# Patient Record
Sex: FEMALE | Race: WHITE | Employment: FULL TIME | URBAN - METROPOLITAN AREA
[De-identification: names, ages, dates, MRNs, and addresses within clinical notes are randomized per-mention and may not be internally consistent; named-entity substitution may affect disease eponyms.]

---

## 2023-11-21 LAB
C. TRACHOMATIS, EXTERNAL RESULT: NEGATIVE
N. GONORRHOEAE, EXTERNAL RESULT: NEGATIVE

## 2023-11-23 PROCEDURE — 99283 EMERGENCY DEPT VISIT LOW MDM: CPT

## 2023-11-23 ASSESSMENT — PAIN - FUNCTIONAL ASSESSMENT: PAIN_FUNCTIONAL_ASSESSMENT: NONE - DENIES PAIN

## 2023-11-24 ENCOUNTER — HOSPITAL ENCOUNTER (EMERGENCY)
Age: 18
Discharge: HOME | End: 2023-11-24
Attending: STUDENT IN AN ORGANIZED HEALTH CARE EDUCATION/TRAINING PROGRAM
Payer: COMMERCIAL

## 2023-11-24 VITALS
SYSTOLIC BLOOD PRESSURE: 136 MMHG | TEMPERATURE: 98.5 F | HEART RATE: 84 BPM | BODY MASS INDEX: 37.49 KG/M2 | OXYGEN SATURATION: 100 % | RESPIRATION RATE: 16 BRPM | WEIGHT: 211.6 LBS | HEIGHT: 63 IN | DIASTOLIC BLOOD PRESSURE: 78 MMHG

## 2023-11-24 DIAGNOSIS — O46.90 VAGINAL BLEEDING IN PREGNANCY: Primary | ICD-10-CM

## 2023-11-24 LAB
ABO + RH BLD: NORMAL
ALBUMIN SERPL-MCNC: 4.3 G/DL (ref 3.4–5)
ALBUMIN/GLOB SERPL: 1.7 {RATIO} (ref 1.1–2.2)
ALP SERPL-CCNC: 59 U/L (ref 40–129)
ALT SERPL-CCNC: 19 U/L (ref 10–40)
ANION GAP SERPL CALCULATED.3IONS-SCNC: 12 MMOL/L (ref 3–16)
AST SERPL-CCNC: 14 U/L (ref 15–37)
B-HCG SERPL EIA 3RD IS-ACNC: NORMAL MIU/ML
BACTERIA GENITAL QL WET PREP: NORMAL
BACTERIA URNS QL MICRO: ABNORMAL /HPF
BASOPHILS # BLD: 0.1 K/UL (ref 0–0.2)
BASOPHILS NFR BLD: 0.7 %
BILIRUB SERPL-MCNC: 0.4 MG/DL (ref 0–1)
BILIRUB UR QL STRIP.AUTO: NEGATIVE
BUN SERPL-MCNC: 13 MG/DL (ref 7–20)
CALCIUM SERPL-MCNC: 9.2 MG/DL (ref 8.3–10.6)
CHLORIDE SERPL-SCNC: 103 MMOL/L (ref 99–110)
CLARITY UR: CLEAR
CLUE CELLS SPEC QL WET PREP: NORMAL
CO2 SERPL-SCNC: 22 MMOL/L (ref 21–32)
COLOR UR: YELLOW
CREAT SERPL-MCNC: 0.7 MG/DL (ref 0.6–1.1)
DEPRECATED RDW RBC AUTO: 13 % (ref 12.4–15.4)
EOSINOPHIL # BLD: 0.1 K/UL (ref 0–0.6)
EOSINOPHIL NFR BLD: 0.9 %
EPI CELLS #/AREA URNS HPF: ABNORMAL /HPF (ref 0–5)
EPI CELLS SPEC QL WET PREP: NORMAL
GFR SERPLBLD CREATININE-BSD FMLA CKD-EPI: >60 ML/MIN/{1.73_M2}
GLUCOSE SERPL-MCNC: 99 MG/DL (ref 70–99)
GLUCOSE UR STRIP.AUTO-MCNC: NEGATIVE MG/DL
HCT VFR BLD AUTO: 36.4 % (ref 36–48)
HGB BLD-MCNC: 12.5 G/DL (ref 12–16)
HGB UR QL STRIP.AUTO: ABNORMAL
KETONES UR STRIP.AUTO-MCNC: NEGATIVE MG/DL
LEUKOCYTE ESTERASE UR QL STRIP.AUTO: NEGATIVE
LYMPHOCYTES # BLD: 2.3 K/UL (ref 1–5.1)
LYMPHOCYTES NFR BLD: 17.6 %
MCH RBC QN AUTO: 28.5 PG (ref 26–34)
MCHC RBC AUTO-ENTMCNC: 34.4 G/DL (ref 31–36)
MCV RBC AUTO: 82.8 FL (ref 80–100)
MONOCYTES # BLD: 0.8 K/UL (ref 0–1.3)
MONOCYTES NFR BLD: 6.5 %
MUCOUS THREADS #/AREA URNS LPF: ABNORMAL /LPF
NEUTROPHILS # BLD: 9.6 K/UL (ref 1.7–7.7)
NEUTROPHILS NFR BLD: 74.3 %
NITRITE UR QL STRIP.AUTO: NEGATIVE
PH UR STRIP.AUTO: 6 [PH] (ref 5–8)
PLATELET # BLD AUTO: 324 K/UL (ref 135–450)
PMV BLD AUTO: 7.2 FL (ref 5–10.5)
POTASSIUM SERPL-SCNC: 3.9 MMOL/L (ref 3.5–5.1)
PROT SERPL-MCNC: 6.8 G/DL (ref 6.4–8.2)
PROT UR STRIP.AUTO-MCNC: NEGATIVE MG/DL
RBC # BLD AUTO: 4.4 M/UL (ref 4–5.2)
RBC #/AREA URNS HPF: ABNORMAL /HPF (ref 0–4)
RBC SPEC QL WET PREP: NORMAL
SODIUM SERPL-SCNC: 137 MMOL/L (ref 136–145)
SP GR UR STRIP.AUTO: 1.02 (ref 1–1.03)
SPECIMEN SOURCE FLD: NORMAL
T VAGINALIS GENITAL QL WET PREP: NORMAL
UA DIPSTICK W REFLEX MICRO PNL UR: YES
URN SPEC COLLECT METH UR: ABNORMAL
UROBILINOGEN UR STRIP-ACNC: 0.2 E.U./DL
WBC # BLD AUTO: 12.9 K/UL (ref 4–11)
WBC #/AREA URNS HPF: ABNORMAL /HPF (ref 0–5)
WBC SPEC QL WET PREP: NORMAL
YEAST GENITAL QL WET PREP: NORMAL

## 2023-11-24 PROCEDURE — 87210 SMEAR WET MOUNT SALINE/INK: CPT

## 2023-11-24 PROCEDURE — 36415 COLL VENOUS BLD VENIPUNCTURE: CPT

## 2023-11-24 PROCEDURE — 87591 N.GONORRHOEAE DNA AMP PROB: CPT

## 2023-11-24 PROCEDURE — 80053 COMPREHEN METABOLIC PANEL: CPT

## 2023-11-24 PROCEDURE — 81001 URINALYSIS AUTO W/SCOPE: CPT

## 2023-11-24 PROCEDURE — 84702 CHORIONIC GONADOTROPIN TEST: CPT

## 2023-11-24 PROCEDURE — 85025 COMPLETE CBC W/AUTO DIFF WBC: CPT

## 2023-11-24 PROCEDURE — 86900 BLOOD TYPING SEROLOGIC ABO: CPT

## 2023-11-24 PROCEDURE — 86901 BLOOD TYPING SEROLOGIC RH(D): CPT

## 2023-11-24 PROCEDURE — 87491 CHLMYD TRACH DNA AMP PROBE: CPT

## 2023-11-24 NOTE — DISCHARGE INSTRUCTIONS
Follow-up with your OB/GYN as soon as able. Call them this morning to set up follow-up appointment for later today or on Monday for reevaluation.   Return to emergency department for any abdominal pain or cramping

## 2023-11-27 NOTE — ED PROVIDER NOTES
Emergency Department Encounter    Patient: Micheline Ramirez  MRN: [id number]  : 2005  Date of Evaluation: 2023  ED Provider:  Lorri Julio MD    Triage Chief Complaint:   Vaginal Bleeding (States she is 6 weeks pregnant started bleeding around 1900 this evening. States she has slowed down has soaked one pad in 2 hours )    [place]:  Micheline Ramirez is a 19 y.o. female presenting with complaints of vaginal bleeding in pregnancy. Patient states she is about 6 weeks pregnant. Patient states around 1900 this evening she began having some vaginal bleeding. Patient states she went through about 1 pad in about 2 hours. States the bleeding does seem to be slowing down currently. States she has not had any abdominal pain or cramping. States she has no vaginal discharge otherwise. Denies fevers or chills. Denies nausea vomiting, diarrhea constipation, blood or melena stools, urinary symptoms including dysuria, increased frequency, urgency, hematuria. Patient states she just recently had an ultrasound on her OB/GYN confirming intrauterine pregnancy. ROS - see HPI, below listed is current ROS at time of my eval:  At least 14 systems reviewed, negative other than HPI    No past medical history on file. No past surgical history on file. No family history on file.   Social History     Socioeconomic History    Marital status: Single     Spouse name: Not on file    Number of children: Not on file    Years of education: Not on file    Highest education level: Not on file   Occupational History    Not on file   Tobacco Use    Smoking status: Passive Smoke Exposure - Never Smoker    Smokeless tobacco: Never   Substance and Sexual Activity    Alcohol use: No    Drug use: No    Sexual activity: Never   Other Topics Concern    Not on file   Social History Narrative    Not on file     Social Determinants of Health     Financial Resource Strain: Not on file   Food Insecurity: Not on file

## 2023-11-28 LAB
C TRACH DNA CVX QL NAA+PROBE: NEGATIVE
N GONORRHOEA DNA CERV MUCUS QL NAA+PROBE: NEGATIVE

## 2023-12-05 LAB
ABO, EXTERNAL RESULT: NORMAL
HEP B, EXTERNAL RESULT: NEGATIVE
HEPATITIS C ANTIBODY, EXTERNAL RESULT: NEGATIVE
HIV, EXTERNAL RESULT: NONREACTIVE
RH FACTOR, EXTERNAL RESULT: POSITIVE
RPR, EXTERNAL RESULT: NONREACTIVE
RUBELLA TITER, EXTERNAL RESULT: NORMAL

## 2024-06-20 LAB — GBS, EXTERNAL RESULT: NEGATIVE

## 2024-07-17 ENCOUNTER — HOSPITAL ENCOUNTER (INPATIENT)
Age: 19
LOS: 1 days | Discharge: HOME | End: 2024-07-18
Attending: OBSTETRICS & GYNECOLOGY | Admitting: OBSTETRICS & GYNECOLOGY
Payer: COMMERCIAL

## 2024-07-17 ENCOUNTER — ANESTHESIA (OUTPATIENT)
Dept: LABOR AND DELIVERY | Age: 19
End: 2024-07-17
Payer: COMMERCIAL

## 2024-07-17 ENCOUNTER — ANESTHESIA EVENT (OUTPATIENT)
Dept: LABOR AND DELIVERY | Age: 19
End: 2024-07-17
Payer: COMMERCIAL

## 2024-07-17 PROBLEM — Z37.9 NORMAL LABOR: Status: ACTIVE | Noted: 2024-07-17

## 2024-07-17 LAB
ABO + RH BLD: NORMAL
AMPHETAMINES UR QL SCN>1000 NG/ML: NORMAL
BARBITURATES UR QL SCN>200 NG/ML: NORMAL
BASOPHILS # BLD: 0 K/UL (ref 0–0.2)
BASOPHILS NFR BLD: 0.2 %
BENZODIAZ UR QL SCN>200 NG/ML: NORMAL
BLD GP AB SCN SERPL QL: NORMAL
BUPRENORPHINE+NOR UR QL SCN: NORMAL
CANNABINOIDS UR QL SCN>50 NG/ML: NORMAL
COCAINE UR QL SCN: NORMAL
DEPRECATED RDW RBC AUTO: 14.3 % (ref 12.4–15.4)
DRUG SCREEN COMMENT UR-IMP: NORMAL
EOSINOPHIL # BLD: 0.1 K/UL (ref 0–0.6)
EOSINOPHIL NFR BLD: 0.4 %
FENTANYL SCREEN, URINE: NORMAL
HCT VFR BLD AUTO: 38.1 % (ref 36–48)
HGB BLD-MCNC: 12.5 G/DL (ref 12–16)
LYMPHOCYTES # BLD: 2.1 K/UL (ref 1–5.1)
LYMPHOCYTES NFR BLD: 12 %
MCH RBC QN AUTO: 26.8 PG (ref 26–34)
MCHC RBC AUTO-ENTMCNC: 32.9 G/DL (ref 31–36)
MCV RBC AUTO: 81.6 FL (ref 80–100)
METHADONE UR QL SCN>300 NG/ML: NORMAL
MONOCYTES # BLD: 0.8 K/UL (ref 0–1.3)
MONOCYTES NFR BLD: 4.2 %
NEUTROPHILS # BLD: 14.8 K/UL (ref 1.7–7.7)
NEUTROPHILS NFR BLD: 83.2 %
OPIATES UR QL SCN>300 NG/ML: NORMAL
OXYCODONE UR QL SCN: NORMAL
PCP UR QL SCN>25 NG/ML: NORMAL
PH UR STRIP: 7 [PH]
PLATELET # BLD AUTO: 344 K/UL (ref 135–450)
PMV BLD AUTO: 8.1 FL (ref 5–10.5)
RBC # BLD AUTO: 4.67 M/UL (ref 4–5.2)
REAGIN+T PALLIDUM IGG+IGM SERPL-IMP: NORMAL
WBC # BLD AUTO: 17.8 K/UL (ref 4–11)

## 2024-07-17 PROCEDURE — 86901 BLOOD TYPING SEROLOGIC RH(D): CPT

## 2024-07-17 PROCEDURE — 1220000000 HC SEMI PRIVATE OB R&B

## 2024-07-17 PROCEDURE — 86850 RBC ANTIBODY SCREEN: CPT

## 2024-07-17 PROCEDURE — 6360000002 HC RX W HCPCS: Performed by: NURSE ANESTHETIST, CERTIFIED REGISTERED

## 2024-07-17 PROCEDURE — 7200000001 HC VAGINAL DELIVERY

## 2024-07-17 PROCEDURE — 6370000000 HC RX 637 (ALT 250 FOR IP): Performed by: ADVANCED PRACTICE MIDWIFE

## 2024-07-17 PROCEDURE — 2500000003 HC RX 250 WO HCPCS: Performed by: NURSE ANESTHETIST, CERTIFIED REGISTERED

## 2024-07-17 PROCEDURE — 86780 TREPONEMA PALLIDUM: CPT

## 2024-07-17 PROCEDURE — 86900 BLOOD TYPING SEROLOGIC ABO: CPT

## 2024-07-17 PROCEDURE — 80307 DRUG TEST PRSMV CHEM ANLYZR: CPT

## 2024-07-17 PROCEDURE — 36415 COLL VENOUS BLD VENIPUNCTURE: CPT

## 2024-07-17 PROCEDURE — 6360000002 HC RX W HCPCS

## 2024-07-17 PROCEDURE — 85025 COMPLETE CBC W/AUTO DIFF WBC: CPT

## 2024-07-17 PROCEDURE — 51701 INSERT BLADDER CATHETER: CPT

## 2024-07-17 RX ORDER — SODIUM CHLORIDE 0.9 % (FLUSH) 0.9 %
5-40 SYRINGE (ML) INJECTION PRN
Status: DISCONTINUED | OUTPATIENT
Start: 2024-07-17 | End: 2024-07-18 | Stop reason: HOSPADM

## 2024-07-17 RX ORDER — ONDANSETRON 2 MG/ML
4 INJECTION INTRAMUSCULAR; INTRAVENOUS EVERY 6 HOURS PRN
Status: DISCONTINUED | OUTPATIENT
Start: 2024-07-17 | End: 2024-07-18 | Stop reason: HOSPADM

## 2024-07-17 RX ORDER — SODIUM CHLORIDE, SODIUM LACTATE, POTASSIUM CHLORIDE, AND CALCIUM CHLORIDE .6; .31; .03; .02 G/100ML; G/100ML; G/100ML; G/100ML
500 INJECTION, SOLUTION INTRAVENOUS PRN
Status: DISCONTINUED | OUTPATIENT
Start: 2024-07-17 | End: 2024-07-18 | Stop reason: HOSPADM

## 2024-07-17 RX ORDER — MISOPROSTOL 100 UG/1
400 TABLET ORAL PRN
Status: DISCONTINUED | OUTPATIENT
Start: 2024-07-17 | End: 2024-07-18 | Stop reason: HOSPADM

## 2024-07-17 RX ORDER — DOCUSATE SODIUM 100 MG/1
100 CAPSULE, LIQUID FILLED ORAL 2 TIMES DAILY
Status: DISCONTINUED | OUTPATIENT
Start: 2024-07-17 | End: 2024-07-18 | Stop reason: HOSPADM

## 2024-07-17 RX ORDER — TRANEXAMIC ACID 10 MG/ML
1000 INJECTION, SOLUTION INTRAVENOUS
Status: ACTIVE | OUTPATIENT
Start: 2024-07-17 | End: 2024-07-18

## 2024-07-17 RX ORDER — FERROUS SULFATE 325(65) MG
325 TABLET ORAL EVERY OTHER DAY
Status: DISCONTINUED | OUTPATIENT
Start: 2024-07-17 | End: 2024-07-18 | Stop reason: HOSPADM

## 2024-07-17 RX ORDER — LANOLIN 100 %
OINTMENT (GRAM) TOPICAL PRN
Status: DISCONTINUED | OUTPATIENT
Start: 2024-07-17 | End: 2024-07-18 | Stop reason: HOSPADM

## 2024-07-17 RX ORDER — SODIUM CHLORIDE 9 MG/ML
25 INJECTION, SOLUTION INTRAVENOUS PRN
Status: DISCONTINUED | OUTPATIENT
Start: 2024-07-17 | End: 2024-07-18 | Stop reason: HOSPADM

## 2024-07-17 RX ORDER — SODIUM CHLORIDE 0.9 % (FLUSH) 0.9 %
5-40 SYRINGE (ML) INJECTION EVERY 12 HOURS SCHEDULED
Status: DISCONTINUED | OUTPATIENT
Start: 2024-07-17 | End: 2024-07-18 | Stop reason: HOSPADM

## 2024-07-17 RX ORDER — BUPIVACAINE HYDROCHLORIDE 2.5 MG/ML
INJECTION, SOLUTION EPIDURAL; INFILTRATION; INTRACAUDAL PRN
Status: DISCONTINUED | OUTPATIENT
Start: 2024-07-17 | End: 2024-07-17 | Stop reason: SDUPTHER

## 2024-07-17 RX ORDER — ONDANSETRON 4 MG/1
4 TABLET, ORALLY DISINTEGRATING ORAL EVERY 6 HOURS PRN
Status: DISCONTINUED | OUTPATIENT
Start: 2024-07-17 | End: 2024-07-18 | Stop reason: HOSPADM

## 2024-07-17 RX ORDER — SODIUM CHLORIDE 9 MG/ML
INJECTION, SOLUTION INTRAVENOUS PRN
Status: DISCONTINUED | OUTPATIENT
Start: 2024-07-17 | End: 2024-07-18 | Stop reason: HOSPADM

## 2024-07-17 RX ORDER — BUPIVACAINE HYDROCHLORIDE 2.5 MG/ML
INJECTION, SOLUTION EPIDURAL; INFILTRATION; INTRACAUDAL
Status: COMPLETED
Start: 2024-07-17 | End: 2024-07-17

## 2024-07-17 RX ORDER — SODIUM CHLORIDE, SODIUM LACTATE, POTASSIUM CHLORIDE, CALCIUM CHLORIDE 600; 310; 30; 20 MG/100ML; MG/100ML; MG/100ML; MG/100ML
INJECTION, SOLUTION INTRAVENOUS CONTINUOUS
Status: DISCONTINUED | OUTPATIENT
Start: 2024-07-17 | End: 2024-07-18 | Stop reason: HOSPADM

## 2024-07-17 RX ORDER — FAMOTIDINE 10 MG/ML
20 INJECTION, SOLUTION INTRAVENOUS 2 TIMES DAILY
Status: DISCONTINUED | OUTPATIENT
Start: 2024-07-17 | End: 2024-07-18 | Stop reason: HOSPADM

## 2024-07-17 RX ORDER — ACETAMINOPHEN 500 MG
1000 TABLET ORAL EVERY 8 HOURS PRN
Status: DISCONTINUED | OUTPATIENT
Start: 2024-07-17 | End: 2024-07-18 | Stop reason: HOSPADM

## 2024-07-17 RX ORDER — IBUPROFEN 800 MG/1
800 TABLET ORAL EVERY 8 HOURS PRN
Status: DISCONTINUED | OUTPATIENT
Start: 2024-07-17 | End: 2024-07-18 | Stop reason: HOSPADM

## 2024-07-17 RX ORDER — TERBUTALINE SULFATE 1 MG/ML
0.25 INJECTION, SOLUTION SUBCUTANEOUS
Status: ACTIVE | OUTPATIENT
Start: 2024-07-17 | End: 2024-07-18

## 2024-07-17 RX ORDER — METHYLERGONOVINE MALEATE 0.2 MG/ML
200 INJECTION INTRAVENOUS PRN
Status: DISCONTINUED | OUTPATIENT
Start: 2024-07-17 | End: 2024-07-18 | Stop reason: HOSPADM

## 2024-07-17 RX ORDER — CARBOPROST TROMETHAMINE 250 UG/ML
250 INJECTION, SOLUTION INTRAMUSCULAR PRN
Status: DISCONTINUED | OUTPATIENT
Start: 2024-07-17 | End: 2024-07-18 | Stop reason: HOSPADM

## 2024-07-17 RX ADMIN — Medication 909.1 MILLI-UNITS/MIN: at 04:32

## 2024-07-17 RX ADMIN — BUPIVACAINE HYDROCHLORIDE 1.4 ML: 2.5 INJECTION, SOLUTION EPIDURAL; INFILTRATION; INTRACAUDAL; PERINEURAL at 03:49

## 2024-07-17 RX ADMIN — Medication 15 ML/HR: at 03:56

## 2024-07-17 RX ADMIN — WITCH HAZEL: 500 SOLUTION RECTAL; TOPICAL at 09:15

## 2024-07-17 RX ADMIN — IBUPROFEN 800 MG: 800 TABLET, FILM COATED ORAL at 14:38

## 2024-07-17 RX ADMIN — BENZOCAINE AND LEVOMENTHOL: 200; 5 SPRAY TOPICAL at 09:26

## 2024-07-17 RX ADMIN — IBUPROFEN 800 MG: 800 TABLET, FILM COATED ORAL at 06:20

## 2024-07-17 ASSESSMENT — PAIN DESCRIPTION - ORIENTATION: ORIENTATION: LOWER

## 2024-07-17 ASSESSMENT — PAIN DESCRIPTION - LOCATION: LOCATION: ABDOMEN

## 2024-07-17 ASSESSMENT — PAIN DESCRIPTION - DESCRIPTORS: DESCRIPTORS: CRAMPING

## 2024-07-17 ASSESSMENT — PAIN SCALES - GENERAL: PAINLEVEL_OUTOF10: 1

## 2024-07-17 NOTE — LACTATION NOTE
This note was copied from a baby's chart.  LACTATION CONSULTATION  Initial Lactation Consult:   Referred by: Patient request   MOB requesting assistance with breastfeeding sleepy infant. Also has some general breastfeeding questions.    Name: Girl Darcy Arroyo       MRN: [id number]         YOB: 2026   Time of Birth: 4:24 AM   Gestational age: Gestational Age: 40w1d   Birth Weight: Birth Weight: 3.247 kg (7 lb 2.5 oz) Most Recent Weight: Weight: 3.247 kg (7 lb 2.5 oz) (Filed from Delivery Summary)   Weight Change from Birth: 0%           Maternal Assessment:  Maternal Data:  Information for the patient's mother:  CruzDarcy [[id number]]   20 y.o.   /Para:   Information for the patient's mother:  Simeon Arroyolakhwinder Philip [[id number]]      Information for the patient's mother:  Simeon Arroyolakhwinder Philip [[id number]]   40w1d     Prenatal Breastfeeding Education: WIC    Prior Breastfeeding Experience: 1st time breastfeeding with this baby     Breastfeeding Goal: Exclusively Breastfeed     Breast Assessment  Right Breast: Wide-spaced   Right Nipple: Everts well , Short, and Flat   Right Areola: WDL   Right Nipple Comfort: comfortable   Right Nipple Integrity: Intact    Left Breast: Wide-spaced   Left Nipple: Everts well , Short, and Flat   Left Areola: WDL   Left Nipple Comfort: comfortable   Left Nipple Integrity: Intact    Medications of Concern:    Maternal Toxicology:   Information for the patient's mother:  Simeon Arroyolakhwinder Philip [[id number]]     Barbiturate Screen, Ur   Date Value Ref Range Status   2024 Neg Negative <200 ng/mL Final     Benzodiazepine Screen, Urine   Date Value Ref Range Status   2024 Neg Negative <200 ng/mL Final     Cannabinoid Scrn, Ur   Date Value Ref Range Status   2024 Neg Negative <50 ng/mL Final     Cocaine Metabolite Screen, Urine   Date Value Ref Range Status   2024 Neg Negative <300 ng/mL Final     Methadone Screen, Urine  breastfeeding assistance from LC or RN as needed.     Feeding Plan reviewed with: Parents     Response:   Verbalized understanding of education and instruction, Active in care, and Demonstrates latch well

## 2024-07-17 NOTE — FLOWSHEET NOTE
pt at 40+1 weeks admitted to T-1 for ROL.   Ctx that became more uncomfortable around 7p.  Denies any VB, or decreased fetal movement.   Pt states she feels her \"water is breaking\".  SVE performed, grossly ruptured.    24   Maternal Vitals (MEW-T)   Pulse  --  94   Heart Rate Source  --  Monitor   Respirations  --  18   BP  --  122/63   BP Method  --  Automatic   Patient Position  --  Semi fowlers   SpO2  --  98 %   Pulse Oximeter Device Mode  --  Intermittent   Pulse Oximeter Device Location  --  Left;Finger   Level of Consciousness  --  Alert   Cervical Exam   Dilation (cm) 6  --    Effacement 80  --    Station -2  --    Station (Labor Curve Graph) 7  --    Presentation Vertex  --    Membrane/Amniotic Fluid   Membrane Status Spontaneous  --    Sac Identifier Sac 1  --    Rupture Date 24  --    Rupture Time 224  --    Amniotic Fluid Color Clear  --    Amniotic Fluid Odor None  --    Amniotic Fluid Amount Moderate  --    Provider Notification   Name of Team Member Notified  --  Olga Lidia   Treatment Team Role  --    (CNM)   Method of Communication  --  Call   Response  --  En route   Notification Time  --  226     CNM called, Orders to admit.  Charge RN made aware.

## 2024-07-17 NOTE — ANESTHESIA PRE PROCEDURE
>3 FB   Neck ROM: full  Mouth opening: > = 3 FB   Dental:          Pulmonary:Negative Pulmonary ROS                              Cardiovascular:Negative CV ROS                      Neuro/Psych:   Negative Neuro/Psych ROS              GI/Hepatic/Renal: Neg GI/Hepatic/Renal ROS            Endo/Other: Negative Endo/Other ROS                    Abdominal:   (+) obese          Vascular: negative vascular ROS.         Other Findings:             Anesthesia Plan      CSE     ASA 2 - emergent             Anesthetic plan and risks discussed with patient.      Plan discussed with attending.              Alternatives to, benifits and risks of continuous lumbar epidural for labor (including, but not limited to, hypotension, spinal headache, inadequate sensory blockade) were discussed in detail with the patient.  All questions were answered to her satisfaction.  The patient desires and agrees to proceed with continuous epidural.      Marv Richards, LACY - CRNA   7/17/2024

## 2024-07-17 NOTE — L&D DELIVERY NOTE
Department of Obstetrics and Gynecology  Spontaneous Vaginal Delivery Note         Pre-operative Diagnosis:  Term pregnancy, Spontaneous labor, and Uncomplicated pregnancy    Post-operative Diagnosis:  Living  infant(s) and Female    Procedure:  Spontaneous vaginal delivery    Surgeon:   CHAIM Duggan    Information for the patient's :  Jayden Arroyo [[id number]]        Anesthesia:  epidural anesthesia    Estimated blood loss:  100 mL    Specimen:  Placenta not sent to pathology     Cord blood sent No    Complications:  none    Condition:  infant stable to general nursery and mother stable    Details of Procedure:   The patient is a 18 y.o. female at 40w1d   OB History          1    Para        Term                AB        Living             SAB        IAB        Ectopic        Molar        Multiple        Live Births                 who was admitted for active phase labor and SROM. She received the following interventions: none She was known to be GBS negative and did not receive antibiotic prophylaxis. The patient progressed rapidly, did receive an epidural, became complete and started to push. Dr. Melendez was called to bedside for possible operative delivery due to prolonged deceleration into 60s. Pt moved infant well during pushes, and therefore did not require operative delivery. After pushing for 23 minutes, the fetal head was at the perineum,  and the rest of the infant delivered atraumatically. She was then placed on maternal abdomen. Cord was clamped and cut after delayed cord clamping. The delivery of the placenta was spontaneous. The perineum and vagina were explored, and interrupted sutures were placed bilaterally into lateral aspects of the hymen with a 4-0 vicryl. 3vc. APGARS 8/9. Heart tones category II during second stage with imminent delivery noted. Weight pending. Mother and infant cuddling skin to skin. This mother intends on breastfeeding.    Dr. Melendez aware of

## 2024-07-17 NOTE — PROGRESS NOTES
Dr. Melendez called with update on pt's status- CNM at bedside, prolonged decel noted, FHR recovered with position changes.

## 2024-07-17 NOTE — PROGRESS NOTES
Pt transferred to [facility] at 0235 via wheelchair.   Alia HARVEY at bedside placing PIV.

## 2024-07-17 NOTE — PROGRESS NOTES
Patient actively pushing.  RN, CNM remains in continuous attendance at the bedside.  Assessment & evaluation of fetal heart rate ongoing via continuous EFM. Dr. Melendez called to bedside for FHR decels, prlonged decel.

## 2024-07-17 NOTE — ANESTHESIA PROCEDURE NOTES
CSE Block    Patient location during procedure: OB  Start time: 7/17/2024 3:36 AM  End time: 7/17/2024 3:45 AM  Reason for block: labor epidural  Staffing  Performed: resident/CRNA   Anesthesiologist: Agus Garcia MD  Resident/CRNA: Marv Richards APRN - CRNA  Performed by: Marv Richards APRN - CRNA  Authorized by: Agus Garcia MD    CSE  Patient position: sitting  Prep: Betadine  Patient monitoring: continuous pulse ox and frequent blood pressure checks  Approach: midline  Provider prep: mask and sterile gloves  Spinal Needle  Needle type: pencil-tip   Needle gauge: 25 G  Needle length: 4.75 in  Epidural Needle  Injection technique: KI saline  Needle type: Tuohy   Needle gauge: 17 G  Needle length: 3.5 in  Location: lumbar (1-5)  Catheter  Catheter type: side hole  Catheter size: 19 G  Test dose: negative (3 cc of 1.5 % xylocaine with epi)  AssessmentT8  Hemodynamics: stable  Additional Notes  Pt. prepped and draped in sterile fashion. Skin wheal with 1% lidocaine. 17ga touhy needle to KI. 25 ga. Spinal needle per touhy. CSF visualized in hub and 1.4 cc of 0.25 % Bupivacaine injected. Needle withdrawn and catheter threaded. Negative test dose. Catheter secured with sterile dressing.  Preanesthetic Checklist  Completed: patient identified, IV checked, risks and benefits discussed, equipment checked, pre-op evaluation, anesthesia consent given, oxygen available and monitors applied/VS acknowledged

## 2024-07-17 NOTE — H&P
Department of Obstetrics and Gynecology   Obstetrics History and Physical        CHIEF COMPLAINT:  contractions, leakage of amniotic fluid    HISTORY OF PRESENT ILLNESS:      The patient is a 18 y.o. female at 40w1d.  OB History          1    Para        Term                AB        Living             SAB        IAB        Ectopic        Molar        Multiple        Live Births                Patient presents with a chief complaint as above and is being admitted for active phase labor. Originally called with irregular contractions around 1930. Then called back with consistent, regular painful contractions. Spontaneously ruptured in triage. Was planning NCB, but has decided to get an epidural.     Estimated Due Date: Estimated Date of Delivery: 24    PRENATAL CARE:    Complicated by:   Obesity - TWG 24#  Leukocytosis    PAST OB HISTORY:  OB History          1    Para        Term                AB        Living             SAB        IAB        Ectopic        Molar        Multiple        Live Births                    Past Medical History:    No past medical history on file.  Past Surgical History:    No past surgical history on file.  Allergies:  Patient has no known allergies.    Social History:    Social History     Socioeconomic History    Marital status: Single     Spouse name: Not on file    Number of children: Not on file    Years of education: Not on file    Highest education level: Not on file   Occupational History    Not on file   Tobacco Use    Smoking status: Passive Smoke Exposure - Never Smoker    Smokeless tobacco: Never   Substance and Sexual Activity    Alcohol use: No    Drug use: No    Sexual activity: Never   Other Topics Concern    Not on file   Social History Narrative    Not on file     Social Determinants of Health     Financial Resource Strain: Not on file   Food Insecurity: Not on file   Transportation Needs: Not on file   Physical Activity: Not on file

## 2024-07-17 NOTE — PROGRESS NOTES
Pt assisted by 2 staff members to restroom for first time get up. Pt denies dizziness or lightheadedness. Gait steady. Pt voided 400  cc urine without difficulty. Pericare done by pt with RN instruction. New ice pack, peripad, and underwear applied. Gown changed. Pt then washed hands and returned to bed.  Linens changed  Pt tolerated well.

## 2024-07-18 VITALS
RESPIRATION RATE: 16 BRPM | DIASTOLIC BLOOD PRESSURE: 64 MMHG | SYSTOLIC BLOOD PRESSURE: 109 MMHG | WEIGHT: 230 LBS | BODY MASS INDEX: 39.27 KG/M2 | HEART RATE: 64 BPM | TEMPERATURE: 98.1 F | HEIGHT: 64 IN | OXYGEN SATURATION: 98 %

## 2024-07-18 LAB
DEPRECATED RDW RBC AUTO: 14.3 % (ref 12.4–15.4)
HCT VFR BLD AUTO: 33.3 % (ref 36–48)
HGB BLD-MCNC: 11 G/DL (ref 12–16)
MCH RBC QN AUTO: 27.3 PG (ref 26–34)
MCHC RBC AUTO-ENTMCNC: 33 G/DL (ref 31–36)
MCV RBC AUTO: 82.8 FL (ref 80–100)
PLATELET # BLD AUTO: 313 K/UL (ref 135–450)
PMV BLD AUTO: 8 FL (ref 5–10.5)
RBC # BLD AUTO: 4.02 M/UL (ref 4–5.2)
WBC # BLD AUTO: 14.9 K/UL (ref 4–11)

## 2024-07-18 PROCEDURE — 85027 COMPLETE CBC AUTOMATED: CPT

## 2024-07-18 PROCEDURE — 36415 COLL VENOUS BLD VENIPUNCTURE: CPT

## 2024-07-18 PROCEDURE — 6370000000 HC RX 637 (ALT 250 FOR IP): Performed by: ADVANCED PRACTICE MIDWIFE

## 2024-07-18 RX ORDER — LANOLIN 100 %
OINTMENT (GRAM) TOPICAL
Refills: 3 | COMMUNITY
Start: 2024-07-18

## 2024-07-18 RX ORDER — PSEUDOEPHEDRINE HCL 30 MG
100 TABLET ORAL 2 TIMES DAILY
COMMUNITY
Start: 2024-07-18

## 2024-07-18 RX ORDER — IBUPROFEN 200 MG
800 TABLET ORAL EVERY 8 HOURS PRN
COMMUNITY
Start: 2024-07-18

## 2024-07-18 RX ADMIN — IBUPROFEN 800 MG: 800 TABLET, FILM COATED ORAL at 09:01

## 2024-07-18 RX ADMIN — DOCUSATE SODIUM 100 MG: 100 CAPSULE, LIQUID FILLED ORAL at 09:01

## 2024-07-18 ASSESSMENT — PAIN DESCRIPTION - DESCRIPTORS: DESCRIPTORS: CRAMPING

## 2024-07-18 ASSESSMENT — PAIN DESCRIPTION - LOCATION: LOCATION: ABDOMEN

## 2024-07-18 ASSESSMENT — PAIN - FUNCTIONAL ASSESSMENT: PAIN_FUNCTIONAL_ASSESSMENT: ACTIVITIES ARE NOT PREVENTED

## 2024-07-18 ASSESSMENT — PAIN SCALES - GENERAL: PAINLEVEL_OUTOF10: 2

## 2024-07-18 NOTE — ANESTHESIA POSTPROCEDURE EVALUATION
Department of Anesthesiology  Postprocedure Note    Patient: Darcy Arroyo  MRN: [id number]  YOB: 2005  Date of evaluation: 7/18/2024    Procedure Summary       Date: 07/17/24 Room / Location:     Anesthesia Start: 0332 Anesthesia Stop: 0424    Procedure: Labor Analgesia Diagnosis:     Scheduled Providers:  Responsible Provider: Agus Garcia MD    Anesthesia Type: CSE ASA Status: 2 - Emergent            Anesthesia Type: No value filed.    Mei Phase I:      Mei Phase II:      Anesthesia Post Evaluation    Level of consciousness: awake  Cardiovascular status: hemodynamically stable  Respiratory status: acceptable  Comments: No apparent complications from neuraxial anesthesia.  Pain management: adequate        No notable events documented.

## 2024-07-18 NOTE — PROGRESS NOTES
Discharge teaching complete.  Written and verbal information given.  Allowed time for questions/answers.  Verbalizes understanding of all information given.  Armbands removed and verified.  Discharged to t car with infant in infant carseat.

## 2024-07-18 NOTE — DISCHARGE SUMMARY
Obstetrical Discharge Form    Gestational Age: 40w1d    Antepartum complications: Obesity    Date of Delivery: 24      Type of Delivery: vaginal, spontaneous    Delivered By: STACIA ARTIS     Baby:      Information for the patient's :  Jayden Arroyo [[id number]]   APGAR One: 8   Information for the patient's :  Jayden Arroyo [[id number]]   APGAR Five: 9   Information for the patient's :  Jayden Arroyo [[id number]]   Birth Weight: 3.247 kg (7 lb 2.5 oz) O    Anesthesia: Epidural    Intrapartum complications: None    Postpartum complications: none    Discharge Medication:      Medication List        START taking these medications      benzocaine-menthol 20-0.5 % Aero spray  Commonly known as: DERMOPLAST  Apply topically as needed for Pain     docusate 100 MG Caps  Commonly known as: COLACE, DULCOLAX  Take 100 mg by mouth 2 times daily     ibuprofen 200 MG tablet  Commonly known as: ADVIL;MOTRIN  Take 4 tablets by mouth every 8 hours as needed for Pain     lanolin ointment  Apply topically as needed.     witch hazel-glycerin pad  Commonly known as: TUCKS  Place rectally as needed.            CONTINUE taking these medications      PRENATAL VITAMINS PO               Where to Get Your Medications        You can get these medications from any pharmacy    You don't need a prescription for these medications  benzocaine-menthol 20-0.5 % Aero spray  docusate 100 MG Caps  ibuprofen 200 MG tablet  lanolin ointment  witch hazel-glycerin pad         Discharge Condition:  good    Discharge Date: 2024    PLAN:  Follow up in 6 weeks for routine PP visit  All questions answered  D/C summary begun at delivery for D/C planning purposes, any delay in discharge from ordered D/C date due to  factors.

## 2024-07-18 NOTE — DISCHARGE INSTRUCTIONS
Thank you for the opportunity to care for you and your family.      Follow up with your OB Provider***    For Breastfeeding moms, you can contact our lactation specialists with any problems or questions you may have.  [facility] lactation [phone]    DIET  Eat a well balanced diet focusing on foods high in fiber and protein.   Drink plenty of fluids especially water.  To avoid constipation you may take a mild stool softener as recommended by your doctor or midwife.    ACTIVITY  Gradually increase your activity.  Resume exercise regimen only after advise by your doctor or midwife.  Avoid lifting anything heavier than your baby or a gallon of milk for SIX weeks.   Avoid driving for two weeks or if taking Narcotics.  Rise slowly from a lying to sitting and then a standing position.  Climb stairs one at a time.  Use caution when carrying your baby up and down the stairs.  NO SEXUAL Activity for 4-6 weeks or until advised by your doctor; nothing in vagina: intercourse, tampons, or douching.   Be prepared to discuss family planning at your follow-up OB visit.   You may feel tired or have a lack of energy.  You may continue your prenatal vitamin to replenish nutrients post delivery.  Nap when baby naps to catch up on sleep.     EMOTIONS  You may feel castañeda, sad, teary, & overwhelmed.  Contact your OB provider if you feel you may be showing signs of postpartum depression, or have thoughts of harming yourself or your infant.  If infant will not stop crying, contact another adult for help or place infant in their crib on their back and take a break.  NEVER shake your infant.      BLEEDING  Vaginal bleeding will decrease in amount over the next few weeks.  You will notice that as your activity increases, your flow may increase.  This is your body's way of telling you, you need take things easier and rest more often.  Call your OB provider if you are saturating more than one maxi pad in an hour & resting does help, if  your bleeding has a foul odor or you are passing clots larger than a lemon.     BREAST CARE  Take medications as recommended by your doctor or midwife for pain  If you develop a warm, red, tender area on your breast or develop a fever contact your OB provider.   For breastfeeding moms:  If you become engorged, feeding may be more difficult or painful for 1-2 days.  You may find it helpful to hand express some milk so that the infant can latch on more easily.   While breastfeeding, continue to take your prenatal vitamins as directed by your doctor or midwife.   Refer to the breastfeeding booklet in the  folder/binder for more information.  For any questions or concerns contact a Lactation Consultant.  Leave a message and your call will be returned.    For NON-breastfeeding moms:  You may apply ice packs to your breasts over you bra for twenty minutes at a time for comfort.  Avoid stimulation to your breasts, when showering allow the water to strike your back not your breasts.    Wear a good fitting bra until your milk dries, such as a sports bra.    INCISIONAL CARE / KINZA CARE  Clean your incision in the shower with mild soap.  After shower pat the incision are dry and allow the area open to air.  If used, Steri-stipes should be removed by 2 weeks.  If used, Staples should be removed by the home nurse or OB office by 1 week.  If used/ordered, an abdominal binder may provide support for your incision.     Use the kinza-bottle after toileting until bleeding stops.  Cleanse your perineum from front to back  If used, stitches will dissolve in 4-6 weeks.  You may use a sitz bath or soak in a clean tub as needed for comfort.  Kegel exercises will help restore bladder control.     SWELLING  Try to keep your legs elevated when you are sitting.   When lying down keep your legs elevated.  When wearing stocking or socks, make sure they are not too tight.    WHEN TO CALL THE DOCTOR  If you have a temp of 100.6 or more.

## 2024-07-18 NOTE — PROGRESS NOTES
Department of Obstetrics and Gynecology  Labor and Delivery  Attending Post Partum Progress Note      SUBJECTIVE:  Feels well and ready for discharge today. Denies emotional concerns. Tolerating regular diet, ambulating well, voiding qs. Minimal discomfort well controlled w/ motrin and tylenol. Baby is nursing well.     OBJECTIVE:      Vitals:  /64   Pulse 64   Temp (P) 98.1 °F (36.7 °C)   Resp (P) 16   Ht 1.626 m (5' 4\")   Wt 104.3 kg (230 lb)   SpO2 98%   Breastfeeding Unknown   BMI 39.48 kg/m²     ABDOMEN:  soft and non-tender, FF@1below U  GENITAL/URINARY:  SLR  LE: No evidence of DVT, mild edema of LE    DATA:    CBC:    Lab Results   Component Value Date/Time    WBC 14.9 07/18/2024 05:42 AM    RBC 4.02 07/18/2024 05:42 AM    HGB 11.0 07/18/2024 05:42 AM    HCT 33.3 07/18/2024 05:42 AM    MCV 82.8 07/18/2024 05:42 AM    RDW 14.3 07/18/2024 05:42 AM     07/18/2024 05:42 AM       ASSESSMENT & PLAN:      Primip s/p vaginal delivery  Stable nursing female infant  Discharge to home today  Reviewed discharge instructions, warning s/s to call for, bleeding precautions, pp depression   Plans OTC advil/tylenol/colace. Enc to continue PNV  F/U in 6 wks for pp visit.

## 2024-07-18 NOTE — PROGRESS NOTES
Discharge Phone Call    Patient Name: Darcy Arroyo     OB Care Provider: Rosenda Melendez MD Discharge Date: 2024    Disposition of baby:    Phone Number: [phone] (home)     Attempts to Contact:  Date:    Caller  Date:    Caller  Date:    Caller    Information for the patient's :  Jayden Arroyo [[id number]]   Delivery Method: Vaginal, Spontaneous     1.  Now that you are at home is your pain being well controlled?   Y/N   If no, instruct to call       provider.      2. Are you breastfeeding?    Y/N    Do you need any extra support from our lactation staff?      Y/N    If yes, provide number for lactation.  3. Have you made or already had your first appointment with the baby's doctor? Y/N   If no, do      you know when to schedule it?  Y/N    4. Have you scheduled your follow-up appointment?  Y/N  If no, do you know when to schedule       it?    Y/N   If no, they can find it on printed discharge instructions.  5. Did staff discuss safe sleep during your stay? Y/N   6. Did we explain things in a way you could understand?  Y/N  7. Were we respectful of your preferences for labor and birth and include you in the plan of       care?  Y/N  If no, please explain _______________________________________________  8. Is there anyone in particular you would like to mention who provided care for you? _______      _________________________________________________________________________     9. Were you given a Post-Birth Warning Signs handout?  Y/N  Do you have it somewhere      easily accessible?  Y/N  If no, please send them a copy and ask them to put it somewhere      easily found.  10. Have you been crying excessively, having anger or mood swings that feel out of control, or       feel like you can't cope with caring for yourself or baby? Y/N   If yes, they may be showing       signs of postpartum depression and should call provider. There is also a        depression test on page C5 in

## 2024-07-18 NOTE — LACTATION NOTE
This note was copied from a baby's chart.  Lactation Progress Note      Data:    F/U consult for primip on DOD with an infant born at 40.1 weeks gestation. MOB reports infant has had a couple good feeds but is very sleepy & not really interested in feeding. Reports infant was born quickly & is working out some amniotic fluid, spitty.     MOB noted to have wide set breasts, did not have much change to breasts during pregnancy, reports they got a little bigger and areola darkened a bit.       Urine output:  x 1 established   Stool output:  x 1 established  Percent weight change from birth:  0%       Action:    Introduced self & ensured name & lactation # is on whiteboard in room. Educated mother about cross cradle & football positions, how to support infants head, how to support the breast, and steps for a ERNESTO. Woke infant and assisted mother position infant at right breast in cross cradle. Infant uninterested. Suggested mother hand express colostrum to entice, reviewed how to do so. MOB able to hand express several large drops which were fed to infant. Re-woke with same result.     Educated mother about how to hand express into cup & feed back to infant. Reviewed breastfeeding education & infant feeding cues. Encouraged mother to allow infant to breast feed on demand anytime feeding cues are shown and if no feeding cues are shown to attempt to wake infant to feed every 2-3 hours. If infant is still too sleepy to latch to hand express colostrum into infants mouth for about ten minutes, then try again in 2-3 hours. After the first day of life to breast feed a minimum of 8-12 times a day per 24 hour period.     Also encouraged mother to avoid giving infant a pacifier, bottle, or pump for at least the first two weeks of life or until breast feeding is well established. Encouraged good hydration, nutrition, and rest, and to keep taking prenatal or multivitamin while lactating. Encouraged much skin to skin between mother  and infant and father and infant. Breast feeding log reviewed, all questions answered. Mother encouraged to call lactation for F/U care as needed.     Response:    MOB verbalized an understanding of education provided and will call for assistance as needed.

## 2024-07-18 NOTE — PROGRESS NOTES
Department of Obstetrics and Gynecology  Labor and Delivery  Attending Post Partum Progress Note      SUBJECTIVE:  PPD 0 s/p . Eating, drinking, voiding and ambulating without difficulty. Bleeding = menses. Pain controlled with medications. Breastfeeding female infant.     OBJECTIVE:      Vitals:  BP (!) 114/55   Pulse (!) 110   Temp 98 °F (36.7 °C) (Oral)   Resp 16   Ht 1.626 m (5' 4\")   Wt 104.3 kg (230 lb)   SpO2 99%   Breastfeeding Unknown   BMI 39.48 kg/m²     ABDOMEN:  FF @ U/1  GENITAL/URINARY:  well-approx    DATA:    CBC:    Lab Results   Component Value Date/Time    WBC 17.8 2024 02:45 AM    RBC 4.67 2024 02:45 AM    HGB 12.5 2024 02:45 AM    HCT 38.1 2024 02:45 AM    MCV 81.6 2024 02:45 AM    RDW 14.3 2024 02:45 AM     2024 02:45 AM       ASSESSMENT & PLAN:    Nl exam PPD 0 s/p . Meeting PP milestones.  Stable breastfeeding female infant.  Continue routine care.
